# Patient Record
Sex: MALE | Race: WHITE | HISPANIC OR LATINO | Employment: FULL TIME | URBAN - METROPOLITAN AREA
[De-identification: names, ages, dates, MRNs, and addresses within clinical notes are randomized per-mention and may not be internally consistent; named-entity substitution may affect disease eponyms.]

---

## 2024-08-09 ENCOUNTER — HOSPITAL ENCOUNTER (EMERGENCY)
Facility: HOSPITAL | Age: 49
Discharge: HOME | End: 2024-08-09
Attending: EMERGENCY MEDICINE
Payer: COMMERCIAL

## 2024-08-09 VITALS
HEART RATE: 65 BPM | SYSTOLIC BLOOD PRESSURE: 121 MMHG | RESPIRATION RATE: 20 BRPM | DIASTOLIC BLOOD PRESSURE: 82 MMHG | OXYGEN SATURATION: 99 % | TEMPERATURE: 98 F

## 2024-08-09 DIAGNOSIS — S62.660B OPEN NONDISPLACED FRACTURE OF DISTAL PHALANX OF RIGHT INDEX FINGER, INITIAL ENCOUNTER: Primary | ICD-10-CM

## 2024-08-09 LAB
ALBUMIN SERPL BCP-MCNC: 4.2 G/DL (ref 3.5–5.2)
ALBUMIN SERPL BCP-MCNC: 4.2 G/DL (ref 3.5–5.2)
ALP SERPL-CCNC: 42 U/L (ref 55–135)
ALP SERPL-CCNC: 42 U/L (ref 55–135)
ALT SERPL W/O P-5'-P-CCNC: 21 U/L (ref 10–44)
ALT SERPL W/O P-5'-P-CCNC: 21 U/L (ref 10–44)
ANION GAP SERPL CALC-SCNC: 11 MMOL/L (ref 8–16)
ANION GAP SERPL CALC-SCNC: 11 MMOL/L (ref 8–16)
AST SERPL-CCNC: 18 U/L (ref 10–40)
AST SERPL-CCNC: 18 U/L (ref 10–40)
BASOPHILS # BLD AUTO: 0.05 K/UL (ref 0–0.2)
BASOPHILS NFR BLD: 0.9 % (ref 0–1.9)
BILIRUB SERPL-MCNC: 0.4 MG/DL (ref 0.1–1)
BILIRUB SERPL-MCNC: 0.4 MG/DL (ref 0.1–1)
BUN SERPL-MCNC: 16 MG/DL (ref 6–20)
BUN SERPL-MCNC: 16 MG/DL (ref 6–20)
CALCIUM SERPL-MCNC: 9.8 MG/DL (ref 8.7–10.5)
CALCIUM SERPL-MCNC: 9.8 MG/DL (ref 8.7–10.5)
CHLORIDE SERPL-SCNC: 104 MMOL/L (ref 95–110)
CHLORIDE SERPL-SCNC: 104 MMOL/L (ref 95–110)
CO2 SERPL-SCNC: 24 MMOL/L (ref 23–29)
CO2 SERPL-SCNC: 24 MMOL/L (ref 23–29)
CREAT SERPL-MCNC: 1.2 MG/DL (ref 0.5–1.4)
CREAT SERPL-MCNC: 1.2 MG/DL (ref 0.5–1.4)
CRP SERPL-MCNC: 1.6 MG/L (ref 0–8.2)
DIFFERENTIAL METHOD BLD: NORMAL
EOSINOPHIL # BLD AUTO: 0.1 K/UL (ref 0–0.5)
EOSINOPHIL NFR BLD: 1.6 % (ref 0–8)
ERYTHROCYTE [DISTWIDTH] IN BLOOD BY AUTOMATED COUNT: 12 % (ref 11.5–14.5)
EST. GFR  (NO RACE VARIABLE): >60 ML/MIN/1.73 M^2
EST. GFR  (NO RACE VARIABLE): >60 ML/MIN/1.73 M^2
GLUCOSE SERPL-MCNC: 93 MG/DL (ref 70–110)
GLUCOSE SERPL-MCNC: 93 MG/DL (ref 70–110)
HCT VFR BLD AUTO: 43.4 % (ref 40–54)
HGB BLD-MCNC: 14.2 G/DL (ref 14–18)
IMM GRANULOCYTES # BLD AUTO: 0.02 K/UL (ref 0–0.04)
IMM GRANULOCYTES NFR BLD AUTO: 0.4 % (ref 0–0.5)
LYMPHOCYTES # BLD AUTO: 1.5 K/UL (ref 1–4.8)
LYMPHOCYTES NFR BLD: 26.9 % (ref 18–48)
MCH RBC QN AUTO: 30.7 PG (ref 27–31)
MCHC RBC AUTO-ENTMCNC: 32.7 G/DL (ref 32–36)
MCV RBC AUTO: 94 FL (ref 82–98)
MONOCYTES # BLD AUTO: 0.5 K/UL (ref 0.3–1)
MONOCYTES NFR BLD: 8.2 % (ref 4–15)
NEUTROPHILS # BLD AUTO: 3.4 K/UL (ref 1.8–7.7)
NEUTROPHILS NFR BLD: 62 % (ref 38–73)
NRBC BLD-RTO: 0 /100 WBC
PLATELET # BLD AUTO: 257 K/UL (ref 150–450)
PMV BLD AUTO: 10.9 FL (ref 9.2–12.9)
POTASSIUM SERPL-SCNC: 4 MMOL/L (ref 3.5–5.1)
POTASSIUM SERPL-SCNC: 4 MMOL/L (ref 3.5–5.1)
PROT SERPL-MCNC: 7.5 G/DL (ref 6–8.4)
PROT SERPL-MCNC: 7.5 G/DL (ref 6–8.4)
RBC # BLD AUTO: 4.63 M/UL (ref 4.6–6.2)
SODIUM SERPL-SCNC: 139 MMOL/L (ref 136–145)
SODIUM SERPL-SCNC: 139 MMOL/L (ref 136–145)
WBC # BLD AUTO: 5.46 K/UL (ref 3.9–12.7)

## 2024-08-09 PROCEDURE — 99284 EMERGENCY DEPT VISIT MOD MDM: CPT | Mod: 25

## 2024-08-09 PROCEDURE — 86140 C-REACTIVE PROTEIN: CPT | Performed by: NURSE PRACTITIONER

## 2024-08-09 PROCEDURE — 25000003 PHARM REV CODE 250: Performed by: EMERGENCY MEDICINE

## 2024-08-09 PROCEDURE — 80053 COMPREHEN METABOLIC PANEL: CPT | Performed by: NURSE PRACTITIONER

## 2024-08-09 PROCEDURE — 85025 COMPLETE CBC W/AUTO DIFF WBC: CPT | Performed by: NURSE PRACTITIONER

## 2024-08-09 RX ORDER — MUPIROCIN 20 MG/G
1 OINTMENT TOPICAL DAILY
Status: DISCONTINUED | OUTPATIENT
Start: 2024-08-10 | End: 2024-08-09

## 2024-08-09 RX ORDER — CEFAZOLIN SODIUM 1 G/3ML
1 INJECTION, POWDER, FOR SOLUTION INTRAMUSCULAR; INTRAVENOUS
Status: DISCONTINUED | OUTPATIENT
Start: 2024-08-09 | End: 2024-08-09

## 2024-08-09 RX ORDER — MUPIROCIN 20 MG/G
1 OINTMENT TOPICAL DAILY
Status: DISCONTINUED | OUTPATIENT
Start: 2024-08-09 | End: 2024-08-09 | Stop reason: HOSPADM

## 2024-08-09 RX ORDER — CEFAZOLIN SODIUM 1 G/3ML
2 INJECTION, POWDER, FOR SOLUTION INTRAMUSCULAR; INTRAVENOUS
Status: DISCONTINUED | OUTPATIENT
Start: 2024-08-09 | End: 2024-08-09

## 2024-08-09 RX ADMIN — MUPIROCIN 1 TUBE: 20 OINTMENT TOPICAL at 09:08

## 2024-08-09 NOTE — FIRST PROVIDER EVALUATION
Medical screening examination initiated.  I have conducted a focused provider triage encounter, findings are as follows:    Brief history of present illness:  Patient is sent to the ER for open fracture of distal right index finger x1 week    Vitals:    08/09/24 1724   BP: (!) 138/90   BP Location: Right arm   Pulse: 76   Resp: 16   Temp: 97.7 °F (36.5 °C)   TempSrc: Oral   SpO2: 100%       Pertinent physical exam:  No acute distress    Brief workup plan:  Labs, imaging, further eval    Preliminary workup initiated; this workup will be continued and followed by the physician or advanced practice provider that is assigned to the patient when roomed.

## 2024-08-09 NOTE — Clinical Note
"Bryce Mendozaio" Chuy Barrios was seen and treated in our emergency department on 8/9/2024.  He may return to work on 08/13/2024.       If you have any questions or concerns, please don't hesitate to call.      CARINE Adkins    "

## 2024-08-10 NOTE — ED PROVIDER NOTES
SCRIBE #1 NOTE: I, Me-Hakan Williamsonolfo, am scribing for, and in the presence of, Umair Perez DO. I have scribed the entire note.       History     Chief Complaint   Patient presents with    Finger Injury     Finger smashed by piece of steel last week; Sara DwyerBxulwrd-[phone] [employer]-this will be a workers comp claim per Sara (boss)     Review of patient's allergies indicates:  No Known Allergies      History of Present Illness     A  was used (BRITNEY).     8/9/2024, 7:16 PM  History obtained from the patient      History of Present Illness: Bryce Barrios is a 48 y.o. male patient who presents to the Emergency Department for evaluation of an index finger injury which onset 10 days ago, on July 31st. Pt states that he was working and a piece of steel fell on his index finger. Pt went to an outside facility 10 days ago, and was prescribed antibiotics (pt cannot specify). Yesterday, pt was seen again, received Tdap vaccination, and was placed on Augmentin. Pt states that he is compliant with medications. Symptoms are constant and moderate in severity. No mitigating or exacerbating factors reported. No associated symptoms reported. Patient denies any fever, chills, CP, SOB, nausea, vomiting, weakness, and all other sxs at this time. Other prior tx includes hydrocodone. No further complaints or concerns at this time.       Arrival mode: Personal vehicle    PCP: No, Primary Doctor        Past Medical History:  No past medical history on file.    Past Surgical History:  No past surgical history on file.      Family History:  No family history on file.    Social History:  Social History     Tobacco Use    Smoking status: Not on file    Smokeless tobacco: Not on file   Substance and Sexual Activity    Alcohol use: Not on file    Drug use: Not on file    Sexual activity: Not on file        Review of Systems     Review of Systems   Constitutional:  Negative for chills and  fever.   HENT:  Negative for sore throat.    Respiratory:  Negative for shortness of breath.    Cardiovascular:  Negative for chest pain.   Gastrointestinal:  Negative for nausea and vomiting.   Genitourinary:  Negative for dysuria.   Musculoskeletal:  Negative for back pain.        (+) index finger injury   Skin:  Negative for rash.   Neurological:  Negative for weakness.   Hematological:  Does not bruise/bleed easily.   All other systems reviewed and are negative.       Physical Exam     Initial Vitals [08/09/24 1724]   BP Pulse Resp Temp SpO2   (!) 138/90 76 16 97.7 °F (36.5 °C) 100 %      MAP       --          Physical Exam  Vitals reviewed.   Musculoskeletal:         General: Normal range of motion.   Skin:     Comments: As noted in pictures   Neurological:      General: No focal deficit present.      Mental Status: He is alert and oriented to person, place, and time.                      ED Course   Procedures  ED Vital Signs:  Vitals:    08/09/24 1724 08/09/24 1900 08/09/24 1930 08/09/24 2030   BP: (!) 138/90 128/84 123/78 118/74   Pulse: 76  61 63   Resp: 16  11 16   Temp: 97.7 °F (36.5 °C)      TempSrc: Oral      SpO2: 100%  99% 99%    08/09/24 2100   BP: 121/82   Pulse: 65   Resp: 20   Temp: 97.8 °F (36.6 °C)   TempSrc: Oral   SpO2: 99%       Abnormal Lab Results:  Labs Reviewed   COMPREHENSIVE METABOLIC PANEL - Abnormal       Result Value    Sodium 139      Potassium 4.0      Chloride 104      CO2 24      Glucose 93      BUN 16      Creatinine 1.2      Calcium 9.8      Total Protein 7.5      Albumin 4.2      Total Bilirubin 0.4      Alkaline Phosphatase 42 (*)     AST 18      ALT 21      eGFR >60      Anion Gap 11     COMPREHENSIVE METABOLIC PANEL - Abnormal    Sodium 139      Potassium 4.0      Chloride 104      CO2 24      Glucose 93      BUN 16      Creatinine 1.2      Calcium 9.8      Total Protein 7.5      Albumin 4.2      Total Bilirubin 0.4      Alkaline Phosphatase 42 (*)     AST 18      ALT 21       eGFR >60      Anion Gap 11     CBC W/ AUTO DIFFERENTIAL    WBC 5.46      RBC 4.63      Hemoglobin 14.2      Hematocrit 43.4      MCV 94      MCH 30.7      MCHC 32.7      RDW 12.0      Platelets 257      MPV 10.9      Immature Granulocytes 0.4      Gran # (ANC) 3.4      Immature Grans (Abs) 0.02      Lymph # 1.5      Mono # 0.5      Eos # 0.1      Baso # 0.05      nRBC 0      Gran % 62.0      Lymph % 26.9      Mono % 8.2      Eosinophil % 1.6      Basophil % 0.9      Differential Method Automated     C-REACTIVE PROTEIN    CRP 1.6          All Lab Results:  Results for orders placed or performed during the hospital encounter of 08/09/24   Comprehensive metabolic panel   Result Value Ref Range    Sodium 139 136 - 145 mmol/L    Potassium 4.0 3.5 - 5.1 mmol/L    Chloride 104 95 - 110 mmol/L    CO2 24 23 - 29 mmol/L    Glucose 93 70 - 110 mg/dL    BUN 16 6 - 20 mg/dL    Creatinine 1.2 0.5 - 1.4 mg/dL    Calcium 9.8 8.7 - 10.5 mg/dL    Total Protein 7.5 6.0 - 8.4 g/dL    Albumin 4.2 3.5 - 5.2 g/dL    Total Bilirubin 0.4 0.1 - 1.0 mg/dL    Alkaline Phosphatase 42 (L) 55 - 135 U/L    AST 18 10 - 40 U/L    ALT 21 10 - 44 U/L    eGFR >60 >60 mL/min/1.73 m^2    Anion Gap 11 8 - 16 mmol/L   CBC auto differential   Result Value Ref Range    WBC 5.46 3.90 - 12.70 K/uL    RBC 4.63 4.60 - 6.20 M/uL    Hemoglobin 14.2 14.0 - 18.0 g/dL    Hematocrit 43.4 40.0 - 54.0 %    MCV 94 82 - 98 fL    MCH 30.7 27.0 - 31.0 pg    MCHC 32.7 32.0 - 36.0 g/dL    RDW 12.0 11.5 - 14.5 %    Platelets 257 150 - 450 K/uL    MPV 10.9 9.2 - 12.9 fL    Immature Granulocytes 0.4 0.0 - 0.5 %    Gran # (ANC) 3.4 1.8 - 7.7 K/uL    Immature Grans (Abs) 0.02 0.00 - 0.04 K/uL    Lymph # 1.5 1.0 - 4.8 K/uL    Mono # 0.5 0.3 - 1.0 K/uL    Eos # 0.1 0.0 - 0.5 K/uL    Baso # 0.05 0.00 - 0.20 K/uL    nRBC 0 0 /100 WBC    Gran % 62.0 38.0 - 73.0 %    Lymph % 26.9 18.0 - 48.0 %    Mono % 8.2 4.0 - 15.0 %    Eosinophil % 1.6 0.0 - 8.0 %    Basophil % 0.9 0.0 - 1.9 %     Differential Method Automated    Comprehensive metabolic panel   Result Value Ref Range    Sodium 139 136 - 145 mmol/L    Potassium 4.0 3.5 - 5.1 mmol/L    Chloride 104 95 - 110 mmol/L    CO2 24 23 - 29 mmol/L    Glucose 93 70 - 110 mg/dL    BUN 16 6 - 20 mg/dL    Creatinine 1.2 0.5 - 1.4 mg/dL    Calcium 9.8 8.7 - 10.5 mg/dL    Total Protein 7.5 6.0 - 8.4 g/dL    Albumin 4.2 3.5 - 5.2 g/dL    Total Bilirubin 0.4 0.1 - 1.0 mg/dL    Alkaline Phosphatase 42 (L) 55 - 135 U/L    AST 18 10 - 40 U/L    ALT 21 10 - 44 U/L    eGFR >60 >60 mL/min/1.73 m^2    Anion Gap 11 8 - 16 mmol/L   C-reactive protein   Result Value Ref Range    CRP 1.6 0.0 - 8.2 mg/L         Imaging Results:  Imaging Results              X-Ray Finger 2 or More Views Right (Final result)  Result time 08/09/24 18:31:37      Final result by Tammie Heller MD (08/09/24 18:31:37)                   Impression:      Three-view exam.  Bandage artifact present.  Distracted fracture of the distal phalanx tuft index finger and soft tissue irregularity or injury.  No joint malalignment.  Possible granular radiodense foreign bodies versus artifact.  Fracture may be open.      Electronically signed by: Tammie Heller  Date:    08/09/2024  Time:    18:31               Narrative:    EXAMINATION:  XR FINGER 2 OR MORE VIEWS RIGHT    CLINICAL HISTORY:  finger injury;                                              The Emergency Provider reviewed the vital signs and test results, which are outlined above.     ED Discussion     7:44 PM: Discussed pt's case with Dr. Rayo Hoover MD (Orthopedic Surgery) who recommends to apply bactroban ointment, Zeroform dressing po antibiotic. Recommends to follow-up with hand surgeon next week, Dr. Levine and Dr. Bryan.     8:08 PM: Reassessed pt at this time. Discussed with pt all pertinent ED information and results. Discussed pt dx and plan of tx. Gave pt all f/u and return to the ED instructions. All questions and  concerns were addressed at this time. Pt expresses understanding of information and instructions, and is comfortable with plan to discharge. Pt is stable for discharge.    I discussed with patient and/or family/caretaker that evaluation in the ED does not suggest any emergent or life threatening medical conditions requiring immediate intervention beyond what was provided in the ED, and I believe patient is safe for discharge.  Regardless, an unremarkable evaluation in the ED does not preclude the development or presence of a serious of life threatening condition. As such, patient was instructed to return immediately for any worsening or change in current symptoms.      ED Course as of 08/11/24 1217   Fri Aug 09, 2024   1927 X-Ray Finger 2 or More Views Right  Three-view exam.  Bandage artifact present.  Distracted fracture of the distal phalanx tuft index finger and soft tissue irregularity or injury.  No joint malalignment.  Possible granular radiodense foreign bodies versus artifact.  Fracture may be open. [CD]   2019 C-reactive protein  Within normal limits [CD]   2019 CBC auto differential  Within normal limits [CD]   2019 Comprehensive metabolic panel(!)  Nonspecific [CD]      ED Course User Index  [CD] Umair Perez, DO     Medical Decision Making  Amount and/or Complexity of Data Reviewed  Labs: ordered. Decision-making details documented in ED Course.  Radiology: ordered and independent interpretation performed. Decision-making details documented in ED Course.    Risk  Prescription drug management.  Risk Details: Differential diagnosis includes but is not limited to:  Cellulitis, abscess, open fracture                ED Medication(s):  Medications - No data to display      There are no discharge medications for this patient.       Follow-up Information       Schedule an appointment as soon as possible for a visit  with Shar Levine MD.    Specialties: Hand Surgery, Orthopedic  Surgery  Contact information:  [address]  [place] [address]  [phone]               Schedule an appointment as soon as possible for a visit  with Carly Ravi MD.    Specialties: Hand Surgery, Orthopedic Surgery  Contact information:  [address]  [place] [address]  [phone]                                 Scribe Attestation:   Scribe #1: I performed the above scribed service and the documentation accurately describes the services I performed. I attest to the accuracy of the note.     Attending:   Physician Attestation Statement for Scribe #1: I, Umair Perez DO, personally performed the services described in this documentation, as scribed by Demar Shahid, in my presence, and it is both accurate and complete.           Clinical Impression       ICD-10-CM ICD-9-CM   1. Open nondisplaced fracture of distal phalanx of right index finger, initial encounter  S62.660B 816.12       Disposition:   Disposition: Discharged  Condition: Stable         Umair Perez DO  08/11/24 1220

## 2024-08-10 NOTE — DISCHARGE INSTRUCTIONS
1.) Continúe con arriaza antibiótico Augmentin según lo prescrito.  2.) Si no recibe da llamada telefónica para da edvin con un cirujano de mano en los próximos 2 o 3 días, llame a arriaza consultorio y programe da edvin de revisión.  3.) Regrese inmediatamente si presenta algún síntoma nuevo o que empeore.

## 2024-08-10 NOTE — ED PROVIDER NOTES
Encounter Date: 8/9/2024       History     Chief Complaint   Patient presents with    Finger Injury     Finger smashed by piece of steel last week; Sara DwyerCehuutp-[phone] [employer]-this will be a workers comp claim per Sara (dominik)     SAMRA  Review of patient's allergies indicates:  No Known Allergies  No past medical history on file.  No past surgical history on file.  No family history on file.     Review of Systems    Physical Exam     Initial Vitals [08/09/24 1724]   BP Pulse Resp Temp SpO2   (!) 138/90 76 16 97.7 °F (36.5 °C) 100 %      MAP       --         Physical Exam              ED Course   Procedures  Labs Reviewed   COMPREHENSIVE METABOLIC PANEL - Abnormal       Result Value    Sodium 139      Potassium 4.0      Chloride 104      CO2 24      Glucose 93      BUN 16      Creatinine 1.2      Calcium 9.8      Total Protein 7.5      Albumin 4.2      Total Bilirubin 0.4      Alkaline Phosphatase 42 (*)     AST 18      ALT 21      eGFR >60      Anion Gap 11     COMPREHENSIVE METABOLIC PANEL - Abnormal    Sodium 139      Potassium 4.0      Chloride 104      CO2 24      Glucose 93      BUN 16      Creatinine 1.2      Calcium 9.8      Total Protein 7.5      Albumin 4.2      Total Bilirubin 0.4      Alkaline Phosphatase 42 (*)     AST 18      ALT 21      eGFR >60      Anion Gap 11     CBC W/ AUTO DIFFERENTIAL    WBC 5.46      RBC 4.63      Hemoglobin 14.2      Hematocrit 43.4      MCV 94      MCH 30.7      MCHC 32.7      RDW 12.0      Platelets 257      MPV 10.9      Immature Granulocytes 0.4      Gran # (ANC) 3.4      Immature Grans (Abs) 0.02      Lymph # 1.5      Mono # 0.5      Eos # 0.1      Baso # 0.05      nRBC 0      Gran % 62.0      Lymph % 26.9      Mono % 8.2      Eosinophil % 1.6      Basophil % 0.9      Differential Method Automated     C-REACTIVE PROTEIN    CRP 1.6            Imaging Results              X-Ray Finger 2 or More Views Right (Final result)  Result time 08/09/24 18:31:37       "Final result by Tammie Heller MD (08/09/24 18:31:37)                   Impression:      Three-view exam.  Bandage artifact present.  Distracted fracture of the distal phalanx tuft index finger and soft tissue irregularity or injury.  No joint malalignment.  Possible granular radiodense foreign bodies versus artifact.  Fracture may be open.      Electronically signed by: Tammie Heller  Date:    08/09/2024  Time:    18:31               Narrative:    EXAMINATION:  XR FINGER 2 OR MORE VIEWS RIGHT    CLINICAL HISTORY:  finger injury;                                       Medications   Tdap (BOOSTRIX) vaccine injection 0.5 mL (0.5 mLs Intramuscular Not Given 8/9/24 1845)   ceFAZolin injection 2 g (has no administration in time range)     Medical Decision Making  Risk  Prescription drug management.                                      Clinical Impression:   ***Please document a Clinical Impression and click the "Refresh" button to refresh your note and automatically pull in before signing.***           "

## 2024-08-14 ENCOUNTER — OFFICE VISIT (OUTPATIENT)
Dept: ORTHOPEDICS | Facility: CLINIC | Age: 49
End: 2024-08-14
Payer: OTHER MISCELLANEOUS

## 2024-08-14 VITALS — BODY MASS INDEX: 27.31 KG/M2 | HEIGHT: 67 IN | WEIGHT: 174 LBS

## 2024-08-14 DIAGNOSIS — S62.639A CLOSED FRACTURE OF TUFT OF DISTAL PHALANX OF FINGER: Primary | ICD-10-CM

## 2024-08-14 PROCEDURE — 99999 PR PBB SHADOW E&M-EST. PATIENT-LVL III: CPT | Mod: PBBFAC,,, | Performed by: ORTHOPAEDIC SURGERY

## 2024-08-14 RX ORDER — AMOXICILLIN AND CLAVULANATE POTASSIUM 875; 125 MG/1; MG/1
1 TABLET, FILM COATED ORAL 2 TIMES DAILY
COMMUNITY
Start: 2024-08-08

## 2024-08-14 RX ORDER — HYDROCODONE BITARTRATE AND ACETAMINOPHEN 5; 325 MG/1; MG/1
1 TABLET ORAL EVERY 6 HOURS PRN
COMMUNITY
Start: 2024-08-08

## 2024-08-14 RX ORDER — MUPIROCIN 20 MG/G
OINTMENT TOPICAL 3 TIMES DAILY
COMMUNITY
Start: 2024-08-06

## 2024-08-14 NOTE — PROGRESS NOTES
Subjective:     Patient ID: Bryce Barrios is a 48 y.o. male.    Chief Complaint: Pain and Injury of the Right Hand      HPI:  The patient is a 48-year-old male with a crush injury right index finger tip 07/31/2024.  He has a granuloma on the tip that will be cauterized with silver nitrate today    History reviewed. No pertinent past medical history.  No past surgical history on file.  No family history on file.  Social History     Socioeconomic History    Marital status: Single   Tobacco Use    Smoking status: Never     Passive exposure: Never    Smokeless tobacco: Never   Substance and Sexual Activity    Alcohol use: Never    Drug use: Never     Medication List with Changes/Refills   Current Medications    AMOXICILLIN-CLAVULANATE 875-125MG (AUGMENTIN) 875-125 MG PER TABLET    Take 1 tablet by mouth 2 (two) times daily.    HYDROCODONE-ACETAMINOPHEN (NORCO) 5-325 MG PER TABLET    Take 1 tablet by mouth every 6 (six) hours as needed.    MUPIROCIN (BACTROBAN) 2 % OINTMENT    Apply topically 3 (three) times daily.     Review of patient's allergies indicates:  No Known Allergies  Review of Systems   Constitutional: Negative for malaise/fatigue.   HENT:  Negative for hearing loss.    Eyes:  Negative for double vision and visual disturbance.   Cardiovascular:  Negative for chest pain.   Respiratory:  Negative for shortness of breath.    Endocrine: Negative for cold intolerance.   Hematologic/Lymphatic: Does not bruise/bleed easily.   Skin:  Negative for poor wound healing and suspicious lesions.   Musculoskeletal:  Negative for gout, joint pain and joint swelling.   Gastrointestinal:  Negative for nausea and vomiting.   Genitourinary:  Negative for dysuria.   Neurological:  Negative for numbness, paresthesias and sensory change.   Psychiatric/Behavioral:  Negative for depression, memory loss and substance abuse. The patient is not nervous/anxious.    Allergic/Immunologic: Negative for persistent infections.        Objective:   Body mass index is 27.25 kg/m².  There were no vitals filed for this visit.             General    Constitutional: He is oriented to person, place, and time. He appears well-developed and well-nourished. No distress.   HENT:   Head: Normocephalic.   Eyes: EOM are normal.   Pulmonary/Chest: Effort normal.   Neurological: He is oriented to person, place, and time.   Psychiatric: He has a normal mood and affect.             Right Hand/Wrist Exam     Inspection   Scars: Wrist - absent Hand -  present  Effusion: Wrist - absent Hand -  absent    Tenderness   The patient is tender to palpation of the [place] area.    Other     Neuorologic Exam    Median Distribution: normal  Ulnar Distribution: normal  Radial Distribution: normal    Comments:  The patient has a granuloma right index finger tip about 1 sq cm in diameter.  This was cauterized with silver nitrate.  There is no sign of infection.          Vascular Exam       Capillary Refill  Right Hand: normal capillary refill          Relevant imaging results reviewed and interpreted by me, discussed with the patient and / or family today radiographs right index finger showed a tuft fracture  Assessment:     Encounter Diagnosis   Name Primary?    Closed fracture of tuft of distal phalanx of finger Yes    Granuloma right index distal phalanx    Plan:   The patient had the granuloma cauterized with silver nitrate.  The wound was redressed.  Wound care was discussed.  He will return in 2 weeks for wound check and re-x-ray                Disclaimer: This note was prepared using a voice recognition system and is likely to have sound alike errors within the text.

## 2024-08-28 ENCOUNTER — HOSPITAL ENCOUNTER (OUTPATIENT)
Dept: RADIOLOGY | Facility: HOSPITAL | Age: 49
Discharge: HOME | End: 2024-08-28
Attending: ORTHOPAEDIC SURGERY
Payer: COMMERCIAL

## 2024-08-28 ENCOUNTER — OFFICE VISIT (OUTPATIENT)
Dept: ORTHOPEDICS | Facility: CLINIC | Age: 49
End: 2024-08-28
Payer: COMMERCIAL

## 2024-08-28 VITALS — HEIGHT: 67 IN | BODY MASS INDEX: 27.3 KG/M2 | WEIGHT: 173.94 LBS

## 2024-08-28 DIAGNOSIS — S62.639A CLOSED FRACTURE OF TUFT OF DISTAL PHALANX OF FINGER: Primary | ICD-10-CM

## 2024-08-28 DIAGNOSIS — S62.639A CLOSED FRACTURE OF TUFT OF DISTAL PHALANX OF FINGER: ICD-10-CM

## 2024-08-28 PROCEDURE — 99999 PR PBB SHADOW E&M-EST. PATIENT-LVL III: CPT | Mod: PBBFAC,,, | Performed by: ORTHOPAEDIC SURGERY

## 2024-08-28 PROCEDURE — 3008F BODY MASS INDEX DOCD: CPT | Mod: CPTII,S$GLB,, | Performed by: ORTHOPAEDIC SURGERY

## 2024-08-28 PROCEDURE — 73140 X-RAY EXAM OF FINGER(S): CPT | Mod: 26,RT,, | Performed by: RADIOLOGY

## 2024-08-28 PROCEDURE — 1159F MED LIST DOCD IN RCRD: CPT | Mod: CPTII,S$GLB,, | Performed by: ORTHOPAEDIC SURGERY

## 2024-08-28 PROCEDURE — 99213 OFFICE O/P EST LOW 20 MIN: CPT | Mod: S$GLB,,, | Performed by: ORTHOPAEDIC SURGERY

## 2024-08-28 PROCEDURE — 1160F RVW MEDS BY RX/DR IN RCRD: CPT | Mod: CPTII,S$GLB,, | Performed by: ORTHOPAEDIC SURGERY

## 2024-08-28 PROCEDURE — 73140 X-RAY EXAM OF FINGER(S): CPT | Mod: TC,RT

## 2024-08-28 NOTE — PROGRESS NOTES
Subjective:     Patient ID: Bryce Barrios is a 48 y.o. male.    Chief Complaint: Pain and Injury of the Right Hand      HPI:  The patient is a 48-year-old male with a right index finger tuft fracture date of injury was 07/31/2024.  He has returned to work with no use right hand light duty.  He has a finger tip protector splint.    No past medical history on file.  No past surgical history on file.  No family history on file.  Social History     Socioeconomic History    Marital status: Single   Tobacco Use    Smoking status: Never     Passive exposure: Never    Smokeless tobacco: Never   Substance and Sexual Activity    Alcohol use: Never    Drug use: Never     Medication List with Changes/Refills   Current Medications    AMOXICILLIN-CLAVULANATE 875-125MG (AUGMENTIN) 875-125 MG PER TABLET    Take 1 tablet by mouth 2 (two) times daily.    HYDROCODONE-ACETAMINOPHEN (NORCO) 5-325 MG PER TABLET    Take 1 tablet by mouth every 6 (six) hours as needed.    MUPIROCIN (BACTROBAN) 2 % OINTMENT    Apply topically 3 (three) times daily.     Review of patient's allergies indicates:  No Known Allergies  Review of Systems   Constitutional: Negative for malaise/fatigue.   HENT:  Negative for hearing loss.    Eyes:  Negative for double vision and visual disturbance.   Cardiovascular:  Negative for chest pain.   Respiratory:  Negative for shortness of breath.    Endocrine: Negative for cold intolerance.   Hematologic/Lymphatic: Does not bruise/bleed easily.   Skin:  Negative for poor wound healing and suspicious lesions.   Musculoskeletal:  Negative for gout, joint pain and joint swelling.   Gastrointestinal:  Negative for nausea and vomiting.   Genitourinary:  Negative for dysuria.   Neurological:  Negative for numbness, paresthesias and sensory change.   Psychiatric/Behavioral:  Negative for depression, memory loss and substance abuse. The patient is not nervous/anxious.    Allergic/Immunologic: Negative for persistent  infections.       Objective:   Body mass index is 27.24 kg/m².  There were no vitals filed for this visit.             General    Constitutional: He is oriented to person, place, and time. He appears well-developed and well-nourished. No distress.   HENT:   Head: Normocephalic.   Eyes: EOM are normal.   Pulmonary/Chest: Effort normal.   Neurological: He is oriented to person, place, and time.   Psychiatric: He has a normal mood and affect.             Right Hand/Wrist Exam     Inspection   Scars: Wrist - absent Hand -  present  Effusion: Wrist - absent Hand -  present    Other     Neuorologic Exam    Median Distribution: normal  Ulnar Distribution: normal  Radial Distribution: normal    Comments:  The right index finger tip injury is healing well.  There is no infection.  There are no motor or sensory deficits          Vascular Exam       Capillary Refill  Right Hand: normal capillary refill          Relevant imaging results reviewed and interpreted by me, discussed with the patient and / or family today radiographs right index finger showed tuft fracture in good position  Assessment:     Encounter Diagnosis   Name Primary?    Closed fracture of tuft of distal phalanx of finger Yes        Plan:     The patient seems to be doing well.  He will return in 3 weeks for re-x-ray right index finger and should be able to return to full duty at that time he will continue light duty with no use right hand for 3 more weeks                Disclaimer: This note was prepared using a voice recognition system and is likely to have sound alike errors within the text.

## 2024-09-18 ENCOUNTER — HOSPITAL ENCOUNTER (OUTPATIENT)
Dept: RADIOLOGY | Facility: HOSPITAL | Age: 49
Discharge: HOME | End: 2024-09-18
Attending: ORTHOPAEDIC SURGERY
Payer: OTHER MISCELLANEOUS

## 2024-09-18 ENCOUNTER — OFFICE VISIT (OUTPATIENT)
Dept: ORTHOPEDICS | Facility: CLINIC | Age: 49
End: 2024-09-18
Payer: OTHER MISCELLANEOUS

## 2024-09-18 VITALS — BODY MASS INDEX: 27.3 KG/M2 | WEIGHT: 173.94 LBS | HEIGHT: 67 IN

## 2024-09-18 DIAGNOSIS — S62.639A CLOSED FRACTURE OF TUFT OF DISTAL PHALANX OF FINGER: ICD-10-CM

## 2024-09-18 DIAGNOSIS — S62.639A CLOSED FRACTURE OF TUFT OF DISTAL PHALANX OF FINGER: Primary | ICD-10-CM

## 2024-09-18 PROCEDURE — 73140 X-RAY EXAM OF FINGER(S): CPT | Mod: TC,RT

## 2024-09-18 PROCEDURE — 73140 X-RAY EXAM OF FINGER(S): CPT | Mod: 26,RT,, | Performed by: RADIOLOGY

## 2024-09-18 PROCEDURE — 99999 PR PBB SHADOW E&M-EST. PATIENT-LVL II: CPT | Mod: PBBFAC,,, | Performed by: ORTHOPAEDIC SURGERY

## 2024-09-18 NOTE — PROGRESS NOTES
Subjective:     Patient ID: Bryce Barrios is a 48 y.o. male.    Chief Complaint: Pain and Injury of the Right Hand      HPI:  The patient is a 48-year-old male with a tuft fracture right index finger date of injury was 07/31/2024.  This is a work-related injury and he has been working with no use of the right hand since that time.  He wishes to resume full duty effective 09/23/2024.    No past medical history on file.  No past surgical history on file.  No family history on file.  Social History     Socioeconomic History    Marital status: Single   Tobacco Use    Smoking status: Never     Passive exposure: Never    Smokeless tobacco: Never   Substance and Sexual Activity    Alcohol use: Never    Drug use: Never     Medication List with Changes/Refills   Current Medications    AMOXICILLIN-CLAVULANATE 875-125MG (AUGMENTIN) 875-125 MG PER TABLET    Take 1 tablet by mouth 2 (two) times daily.    HYDROCODONE-ACETAMINOPHEN (NORCO) 5-325 MG PER TABLET    Take 1 tablet by mouth every 6 (six) hours as needed.    MUPIROCIN (BACTROBAN) 2 % OINTMENT    Apply topically 3 (three) times daily.     Review of patient's allergies indicates:  No Known Allergies  Review of Systems   Constitutional: Negative for malaise/fatigue.   HENT:  Negative for hearing loss.    Eyes:  Negative for double vision and visual disturbance.   Cardiovascular:  Negative for chest pain.   Respiratory:  Negative for shortness of breath.    Endocrine: Negative for cold intolerance.   Hematologic/Lymphatic: Does not bruise/bleed easily.   Skin:  Negative for poor wound healing and suspicious lesions.   Musculoskeletal:  Negative for gout, joint pain and joint swelling.   Gastrointestinal:  Negative for nausea and vomiting.   Genitourinary:  Negative for dysuria.   Neurological:  Negative for numbness, paresthesias and sensory change.   Psychiatric/Behavioral:  Negative for depression, memory loss and substance abuse. The patient is not  nervous/anxious.    Allergic/Immunologic: Negative for persistent infections.       Objective:   Body mass index is 27.24 kg/m².  There were no vitals filed for this visit.             General    Constitutional: He is oriented to person, place, and time. He appears well-developed and well-nourished. No distress.   HENT:   Head: Normocephalic.   Eyes: EOM are normal.   Pulmonary/Chest: Effort normal.   Neurological: He is oriented to person, place, and time.   Psychiatric: He has a normal mood and affect.             Right Hand/Wrist Exam     Inspection   Scars: Wrist - absent Hand -  absent  Effusion: Wrist - absent Hand -  absent    Other     Neuorologic Exam    Median Distribution: normal  Ulnar Distribution: normal  Radial Distribution: normal            Relevant imaging results reviewed and interpreted by me, discussed with the patient and / or family today radiographs of the right index finger showed fibrous union of the tuft fracture right index finger  Assessment:     Encounter Diagnosis   Name Primary?    Closed fracture of tuft of distal phalanx of finger Yes    Right index finger    Plan:       The patient seems to be doing well.  He declines referral to physical therapy.  He will work on motion.  He will resume full duty effective 09/23/2024.  He will return on a as needed basis.              Disclaimer: This note was prepared using a voice recognition system and is likely to have sound alike errors within the text.